# Patient Record
Sex: OTHER/UNKNOWN | Race: WHITE | URBAN - METROPOLITAN AREA
[De-identification: names, ages, dates, MRNs, and addresses within clinical notes are randomized per-mention and may not be internally consistent; named-entity substitution may affect disease eponyms.]

---

## 2017-03-29 ENCOUNTER — APPOINTMENT (OUTPATIENT)
Dept: URBAN - METROPOLITAN AREA CLINIC 290 | Age: 52
Setting detail: DERMATOLOGY
End: 2017-03-29

## 2017-03-29 ENCOUNTER — RX ONLY (RX ONLY)
Age: 52
End: 2017-03-29

## 2017-03-29 DIAGNOSIS — L71.0 PERIORAL DERMATITIS: ICD-10-CM

## 2017-03-29 DIAGNOSIS — L21.8 OTHER SEBORRHEIC DERMATITIS: ICD-10-CM

## 2017-03-29 PROCEDURE — OTHER MIPS QUALITY: OTHER

## 2017-03-29 PROCEDURE — OTHER TREATMENT REGIMEN: OTHER

## 2017-03-29 PROCEDURE — OTHER PRESCRIPTION: OTHER

## 2017-03-29 PROCEDURE — OTHER COUNSELING: OTHER

## 2017-03-29 PROCEDURE — 99202 OFFICE O/P NEW SF 15 MIN: CPT

## 2017-03-29 RX ORDER — TACROLIMUS 1 MG/G
OINTMENT TOPICAL TWICE DAILY
Qty: 60 | Refills: 0 | Status: ERX | COMMUNITY
Start: 2017-03-29

## 2017-03-29 RX ORDER — PIMECROLIMUS 10 MG/G
CREAM TOPICAL
Qty: 30 | Refills: 0 | Status: ERX | COMMUNITY
Start: 2017-03-29

## 2017-03-29 ASSESSMENT — LOCATION DETAILED DESCRIPTION DERM
LOCATION DETAILED: LEFT INFERIOR MEDIAL MALAR CHEEK
LOCATION DETAILED: LEFT INFERIOR CENTRAL MALAR CHEEK

## 2017-03-29 ASSESSMENT — LOCATION ZONE DERM: LOCATION ZONE: FACE

## 2017-03-29 ASSESSMENT — LOCATION SIMPLE DESCRIPTION DERM: LOCATION SIMPLE: LEFT CHEEK

## 2017-03-29 NOTE — PROCEDURE: TREATMENT REGIMEN
Detail Level: Detailed
Plan: Change soap to Dove unscented.  Discontinue Triamcinolone
Plan: Protopic twice daily for two months.  Change soap to Dove unscented.  Discontinue Triamcinolone

## 2017-03-29 NOTE — HPI: RASH
Is This A New Presentation, Or A Follow-Up?: Rash
Additional History: The patient states she has been using the Triamcinolone twice daily for 2 months

## 2017-05-03 ENCOUNTER — APPOINTMENT (OUTPATIENT)
Dept: URBAN - METROPOLITAN AREA CLINIC 290 | Age: 52
Setting detail: DERMATOLOGY
End: 2017-05-03

## 2017-05-03 DIAGNOSIS — L71.0 PERIORAL DERMATITIS: ICD-10-CM

## 2017-05-03 PROCEDURE — OTHER TREATMENT REGIMEN: OTHER

## 2017-05-03 PROCEDURE — OTHER COUNSELING: OTHER

## 2017-05-03 PROCEDURE — 99213 OFFICE O/P EST LOW 20 MIN: CPT

## 2017-05-03 PROCEDURE — OTHER PRESCRIPTION: OTHER

## 2017-05-03 RX ORDER — DOXYCYCLINE HYCLATE 50 MG/1
TABLET ORAL
Qty: 30 | Refills: 0 | Status: ERX | COMMUNITY
Start: 2017-05-03

## 2017-05-03 ASSESSMENT — LOCATION ZONE DERM: LOCATION ZONE: FACE

## 2017-05-03 ASSESSMENT — LOCATION DETAILED DESCRIPTION DERM: LOCATION DETAILED: LEFT INFERIOR MEDIAL MALAR CHEEK

## 2017-05-03 ASSESSMENT — LOCATION SIMPLE DESCRIPTION DERM: LOCATION SIMPLE: LEFT CHEEK

## 2017-05-03 NOTE — PROCEDURE: TREATMENT REGIMEN
Plan: Apply Aczone once a day in the morning - X3 samples given \\nContinue Elidel twice daily
Detail Level: Simple

## 2017-05-31 ENCOUNTER — APPOINTMENT (OUTPATIENT)
Dept: URBAN - METROPOLITAN AREA CLINIC 290 | Age: 52
Setting detail: DERMATOLOGY
End: 2017-05-31

## 2017-05-31 DIAGNOSIS — L71.0 PERIORAL DERMATITIS: ICD-10-CM

## 2017-05-31 PROCEDURE — 99213 OFFICE O/P EST LOW 20 MIN: CPT

## 2017-05-31 PROCEDURE — OTHER PRESCRIPTION: OTHER

## 2017-05-31 PROCEDURE — OTHER TREATMENT REGIMEN: OTHER

## 2017-05-31 PROCEDURE — OTHER COUNSELING: OTHER

## 2017-05-31 RX ORDER — DOXYCYCLINE HYCLATE 50 MG/1
CAPSULE, GELATIN COATED ORAL
Qty: 30 | Refills: 0 | Status: ERX | COMMUNITY
Start: 2017-05-31

## 2017-05-31 RX ORDER — DAPSONE 75 MG/G
GEL TOPICAL
Qty: 60 | Refills: 1 | Status: ERX | COMMUNITY
Start: 2017-05-31

## 2017-05-31 ASSESSMENT — LOCATION ZONE DERM: LOCATION ZONE: FACE

## 2017-05-31 ASSESSMENT — LOCATION DETAILED DESCRIPTION DERM: LOCATION DETAILED: LEFT INFERIOR CENTRAL MALAR CHEEK

## 2017-05-31 ASSESSMENT — LOCATION SIMPLE DESCRIPTION DERM: LOCATION SIMPLE: LEFT CHEEK
